# Patient Record
Sex: MALE | Race: WHITE | NOT HISPANIC OR LATINO | URBAN - METROPOLITAN AREA
[De-identification: names, ages, dates, MRNs, and addresses within clinical notes are randomized per-mention and may not be internally consistent; named-entity substitution may affect disease eponyms.]

---

## 2019-12-12 ENCOUNTER — EMERGENCY (EMERGENCY)
Facility: HOSPITAL | Age: 44
LOS: 1 days | Discharge: HOME | End: 2019-12-12
Attending: EMERGENCY MEDICINE | Admitting: EMERGENCY MEDICINE
Payer: COMMERCIAL

## 2019-12-12 VITALS
OXYGEN SATURATION: 95 % | DIASTOLIC BLOOD PRESSURE: 80 MMHG | RESPIRATION RATE: 16 BRPM | WEIGHT: 190.04 LBS | SYSTOLIC BLOOD PRESSURE: 119 MMHG | HEIGHT: 72 IN | HEART RATE: 86 BPM | TEMPERATURE: 98 F

## 2019-12-12 PROCEDURE — 70450 CT HEAD/BRAIN W/O DYE: CPT | Mod: 26

## 2019-12-12 PROCEDURE — 99284 EMERGENCY DEPT VISIT MOD MDM: CPT

## 2019-12-12 RX ORDER — METOCLOPRAMIDE HCL 10 MG
10 TABLET ORAL ONCE
Refills: 0 | Status: COMPLETED | OUTPATIENT
Start: 2019-12-12 | End: 2019-12-12

## 2019-12-12 RX ADMIN — Medication 10 MILLIGRAM(S): at 19:34

## 2019-12-12 NOTE — ED ADULT NURSE NOTE - NSIMPLEMENTINTERV_GEN_ALL_ED
Implemented All Universal Safety Interventions:  [place] to call system. Call bell, personal items and telephone within reach. Instruct patient to call for assistance. Room bathroom lighting operational. Non-slip footwear when patient is off stretcher. Physically safe environment: no spills, clutter or unnecessary equipment. Stretcher in lowest position, wheels locked, appropriate side rails in place.

## 2019-12-12 NOTE — ED PROVIDER NOTE - NSFOLLOWUPINSTRUCTIONS_ED_ALL_ED_FT
Please call Dr Hinojosa for an appointment and also call for a neurology appointment tomorrow.  Please take ibuprofen 600mg or Tylenol 650mg every 6 hours as needed for pain.

## 2019-12-12 NOTE — ED PROVIDER NOTE - OBJECTIVE STATEMENT
43 y/o M with depression on Wellbutrin and herpes on Acyclovir presents to the ED c/o pain in eye and a concentrated headache on L side. Pt states he doesn't usually get headaches. He took Tylenol and received shot of Toradol at urgent care with little to no relief. Allergy to Sulfa drugs. Last week had valve repaired in leg. Denies fevers, chills, nausea, vomiting, numbness, chest pain, shortness of breath, or LOC. 45 y/o M with depression on Wellbutrin and herpes on Acyclovir presents to the ED c/o pain in eye and a concentrated headache on L side. Pt states he doesn't usually get headaches. He took Tylenol and received shot of Toradol at urgent care with little to no relief. Pt also experiencing tearing in L eye. Allergy to Sulfa drugs. Last week had valve repaired in leg. Denies fevers, chills, nausea, vomiting, changes in vision or hearing,  numbness, chest pain, shortness of breath, or LOC.

## 2019-12-12 NOTE — ED PROVIDER NOTE - PATIENT PORTAL LINK FT
You can access the FollowMyHealth Patient Portal offered by [facility] by registering at the following website: [email or web address]. By joining [employer]’s FollowMyHealth portal, you will also be able to view your health information using other applications (apps) compatible with our system.

## 2019-12-12 NOTE — ED ADULT NURSE NOTE - OBJECTIVE STATEMENT
Pt c/o of left sided H/A that radiates to back of head.  Denies visual disturbances.  Pt AOX3.  Speech clear.

## 2019-12-12 NOTE — ED PROVIDER NOTE - CLINICAL SUMMARY MEDICAL DECISION MAKING FREE TEXT BOX
45 y/o M presents to the ED c/o L sided headache and eye pain. Exam unremarkable. Ordered Reglan and head CT. 45 y/o M presents to the ED c/o L sided headache and eye pain. Exam unremarkable. Ordered Reglan and head CT.  CT neg, f/u with Dr Hinojosa pmd and neuro.

## 2019-12-12 NOTE — ED PROVIDER NOTE - CHPI ED SYMPTOMS NEG
no chest pain, no shortness of breath/no numbness/no nausea/no vomiting/no fever/no chills no vomiting/no chills/no fever/no numbness/no chest pain, no shortness of breath, no changes in vision or hearing/no nausea

## 2019-12-12 NOTE — ED PROVIDER NOTE - CARE PROVIDER_API CALL
Mickie Moser)  Neurology  [address] [place] [address]  Phone: [phone]  Fax: [phone]  Follow Up Time:     Aayush Min)  Neurology; Neuromuscular Medicine  [address], [address] [place] [id number]  Phone: [phone]  Fax: [phone]  Follow Up Time:

## 2019-12-12 NOTE — ED PROVIDER NOTE - PROGRESS NOTE DETAILS
Informed pt CT neg. Pain currently localized in eye. Reglan not giving much relief. Had stress test 2 mo ago. Sees PCP Dr. Hinojosa. Never seen neurologist.

## 2019-12-13 ENCOUNTER — INBOUND DOCUMENT (OUTPATIENT)
Age: 44
End: 2019-12-13

## 2019-12-13 PROBLEM — Z00.00 ENCOUNTER FOR PREVENTIVE HEALTH EXAMINATION: Status: ACTIVE | Noted: 2019-12-13

## 2019-12-21 DIAGNOSIS — R51 HEADACHE: ICD-10-CM

## 2019-12-21 DIAGNOSIS — H57.12 OCULAR PAIN, LEFT EYE: ICD-10-CM

## 2021-04-07 ENCOUNTER — TRANSCRIPTION ENCOUNTER (OUTPATIENT)
Age: 46
End: 2021-04-07

## 2023-10-08 ENCOUNTER — NON-APPOINTMENT (OUTPATIENT)
Age: 48
End: 2023-10-08

## 2023-10-18 ENCOUNTER — NON-APPOINTMENT (OUTPATIENT)
Age: 48
End: 2023-10-18

## 2024-07-10 ENCOUNTER — NON-APPOINTMENT (OUTPATIENT)
Age: 49
End: 2024-07-10

## 2025-01-24 ENCOUNTER — NON-APPOINTMENT (OUTPATIENT)
Age: 50
End: 2025-01-24